# Patient Record
(demographics unavailable — no encounter records)

---

## 2025-02-13 NOTE — ASSESSMENT
[FreeTextEntry1] : 77 year old woman with non-obstructive CAD, HFmrEF, DM, HLD, chronic PE/DVT on warfarin and hypothyroidism who presents for follow up today.  1. HFmrEF: ACC/AHA C, NYHA II; LVEF improved to 50-55%. She has chronic leg swelling but is otherwise euvolemic on exam. - For GDMT: Increase Bisoprolol to 10mg daily as below; I am not using other beta blockers due to her lung disease - Continue losartan 25mg daily  2. CAD: non-obstructive as per cardiac catheterization 1/2023 with 70% mid LAD, FFR negative. She no longer has chest pain.  - PCP decreased Atorvastatin to 40mg daily as her LDL was "too low". She needs to be on a high intensity statin. I am okay with her being on 40mg daily but advised them not to decrease it further.   3. Chronic PE/DVT: She is on warfarin.   4. PVCs: She has PVCs on EKG and is having a lot of palpitations today. Increase Bisoprolol to 10mg daily.  5. Preoperative risk stratification: for cataract surgery. Patient says she has palpitations, daughter says she has chest pain and dizziness; she has shortness of breath. Will need to do a stress test prior to risk stratification.   The prevention of heart disease was discussed in detail with the patient, including adhering to a heart healthy, plant based, or Mediterranean diet, and the importance of 30 minutes of moderate intensity activity for 30 minutes, 5 times a week. All the patient's questions were answered.  RTC in 2 months.

## 2025-02-13 NOTE — PHYSICAL EXAM
[Well Developed] : well developed [Well Nourished] : well nourished [No Acute Distress] : no acute distress [Normal Conjunctiva] : normal conjunctiva [Normal Venous Pressure] : normal venous pressure [No Carotid Bruit] : no carotid bruit [Normal S1, S2] : normal S1, S2 [No Murmur] : no murmur [No Rub] : no rub [No Gallop] : no gallop [Good Air Entry] : good air entry [No Respiratory Distress] : no respiratory distress  [Sheron ____] : sheron [unfilled] [Soft] : abdomen soft [Non Tender] : non-tender [No Masses/organomegaly] : no masses/organomegaly [Normal Bowel Sounds] : normal bowel sounds [Normal Gait] : normal gait [No Cyanosis] : no cyanosis [No Clubbing] : no clubbing [No Rash] : no rash [No Skin Lesions] : no skin lesions [Moves all extremities] : moves all extremities [No Focal Deficits] : no focal deficits [Normal Speech] : normal speech [Alert and Oriented] : alert and oriented [Normal memory] : normal memory [de-identified] : 2+ pitting edema, venous stasis changes

## 2025-02-13 NOTE — CARDIOLOGY SUMMARY
[de-identified] : 2/13/25: normal sinus rhythm, LVH, PVCs 5/2/24: normal sinus rhythm, PVCs 1/17/24: normal sinus rhythm, PVCs 9/13/23: normal sinus rhythm 5/24/23: normal sinus rhythm [de-identified] : 12/21/22: abnormal  [de-identified] : 7/24/24: 1. Left ventricular cavity is normal in size. Left ventricular systolic function is normal with an ejection fraction visually estimated at 50 to 55 %. There is increased LV mass and eccentric hypertrophy. 2. Normal right ventricular cavity size and normal right ventricular systolic function. 3. No significant valvular disease. 4. Compared to the transthoracic echocardiogram performed on 7/24/2023, there have been no significant interval changes.  7/24/23: 1. Normal left ventricular cavity size. The left ventricular systolic function is mildly decreased with an ejection fraction of 52 % by Bearden's method of disks. There is increased LV mass and concentric hypertrophy. There is mild (grade 1) left ventricular diastolic dysfunction. 2. Normal right ventricular cavity size and normal right ventricular systolic function. 3. No significant valvular disease. 4. Compared to the transthoracic echocardiogram performed on 12/27/2022 The LVEF has improved on current study.  12/27/22: Normal left size, LVEF 45%. RWMA, Grade I DD. No significant valve disease.  [de-identified] : 1/9/23: 70% mid LAD, FFR negative

## 2025-02-13 NOTE — HISTORY OF PRESENT ILLNESS
[FreeTextEntry1] : MAGDIEL ROGERS is a 77 year old woman with non-obstructive CAD, HFmrEF, DM, HLD, chronic PE/DVT on warfarin and hypothyroidism who presents for follow up today.   She is not feeling great today. She no longer has chest pain. She continues to have palpitations on exertion. She continues to have shortness of breath. She denies shortness of breath at rest but has it with exertion like climbing the few stairs in her home. She denies syncope. She has chronic leg swelling form her DVTs. She denies orthopnea and PND. She uses a cane to walk. She has been feeling very dizzy when he moves her head from a lower to a higher position.   For her heart failure with recovered EF, she is on bisoprolol and losartan. She is tolerating these well.   She needs preoperative risk stratification for cataract surgery.

## 2025-02-13 NOTE — REVIEW OF SYSTEMS
[SOB] : shortness of breath [Dyspnea on exertion] : dyspnea during exertion [Chest Discomfort] : chest discomfort [Lower Ext Edema] : lower extremity edema [Blood in Stool] : blood in stool [Joint Pain] : joint pain [Negative] : Heme/Lymph

## 2025-04-10 NOTE — CARDIOLOGY SUMMARY
[de-identified] : 2/13/25: normal sinus rhythm, LVH, PVCs 5/2/24: normal sinus rhythm, PVCs 1/17/24: normal sinus rhythm, PVCs 9/13/23: normal sinus rhythm 5/24/23: normal sinus rhythm [de-identified] : 3/17/25: 1.  There is a large sized perfusion defect involving the basal to mid anteroseptal, apical septal, apical anterior, apical lateral and apical walls of the left ventricle. On the resting images, this perfusion defect is partially reversible. These findings are suggestive of ischemia with an element of attenuation artifact. 2.  LVEF is 52%, which is borderline normal. 3.  This is an intermediate risk positive study. 4.  Compared to nuclear medicine stress test from 12/21/2022, the perfusion defect is more severe. 12/21/22: abnormal  [de-identified] : 7/24/24: 1. Left ventricular cavity is normal in size. Left ventricular systolic function is normal with an ejection fraction visually estimated at 50 to 55 %. There is increased LV mass and eccentric hypertrophy. 2. Normal right ventricular cavity size and normal right ventricular systolic function. 3. No significant valvular disease. 4. Compared to the transthoracic echocardiogram performed on 7/24/2023, there have been no significant interval changes.  7/24/23: 1. Normal left ventricular cavity size. The left ventricular systolic function is mildly decreased with an ejection fraction of 52 % by Bearden's method of disks. There is increased LV mass and concentric hypertrophy. There is mild (grade 1) left ventricular diastolic dysfunction. 2. Normal right ventricular cavity size and normal right ventricular systolic function. 3. No significant valvular disease. 4. Compared to the transthoracic echocardiogram performed on 12/27/2022 The LVEF has improved on current study.  12/27/22: Normal left size, LVEF 45%. RWMA, Grade I DD. No significant valve disease.  [de-identified] : 3/24/25: 60% mid LAD, iFR negative 1/9/23: 70% mid LAD, FFR negative

## 2025-04-10 NOTE — PHYSICAL EXAM
[Well Developed] : well developed [Well Nourished] : well nourished [No Acute Distress] : no acute distress [Normal Conjunctiva] : normal conjunctiva [Normal Venous Pressure] : normal venous pressure [No Carotid Bruit] : no carotid bruit [Normal S1, S2] : normal S1, S2 [No Murmur] : no murmur [No Rub] : no rub [No Gallop] : no gallop [Good Air Entry] : good air entry [No Respiratory Distress] : no respiratory distress  [Sheron ____] : sheron [unfilled] [Soft] : abdomen soft [Non Tender] : non-tender [No Masses/organomegaly] : no masses/organomegaly [Normal Bowel Sounds] : normal bowel sounds [Normal Gait] : normal gait [No Cyanosis] : no cyanosis [No Clubbing] : no clubbing [No Rash] : no rash [No Skin Lesions] : no skin lesions [Moves all extremities] : moves all extremities [No Focal Deficits] : no focal deficits [Normal Speech] : normal speech [Alert and Oriented] : alert and oriented [Normal memory] : normal memory [de-identified] : 2+ pitting edema, venous stasis changes

## 2025-04-10 NOTE — HISTORY OF PRESENT ILLNESS
[FreeTextEntry1] : MAGDIEL ROGERS is a 78 year old woman with non-obstructive CAD, HFmrEF, DM, HLD, chronic PE/DVT on warfarin and hypothyroidism who presents for follow up today.   She is not feeling great today. She no longer has chest pain. She continues to have palpitations on exertion. She continues to have shortness of breath. She denies shortness of breath at rest but has it with exertion like climbing the few stairs in her home. She denies syncope. She has chronic leg swelling form her DVTs. She denies orthopnea and PND. She uses a cane to walk. She has been feeling very dizzy when he moves her head from a lower to a higher position.   For her heart failure with recovered EF, she is on bisoprolol and losartan. She is tolerating these well.   She needs preoperative risk stratification for cataract surgery.   She had a cath on 3/24/25 after an abnormal stress test. It showed 60% mid LAD lesion, similar from previous.

## 2025-04-10 NOTE — ASSESSMENT
[FreeTextEntry1] : 78 year old woman with non-obstructive CAD, HFmrEF, DM, HLD, chronic PE/DVT on warfarin and hypothyroidism who presents for follow up today.  1. HFmrEF: ACC/AHA C, NYHA II; LVEF improved to 50-55%. She has chronic leg swelling but is otherwise euvolemic on exam. - For GDMT: Increased Bisoprolol to 10mg daily as below; I am not using other beta blockers due to her lung disease - Continue losartan 25mg daily  2. CAD: 60% mid LAD, iFR negative on LHC done 3/2025. She denies anginal symptoms today.  - PCP decreased Atorvastatin to 40mg daily as her LDL was "too low". She needs to be on a high intensity statin. I am okay with her being on 40mg daily but advised them not to decrease it further.   3. Chronic PE/DVT: She is on warfarin.   4. PVCs: She has PVCs on EKG and is having a lot of palpitations today. Increase Bisoprolol to 10mg daily.  5. Preoperative risk stratification: for cataract surgery. She is moderate to high risk for surgery.   The prevention of heart disease was discussed in detail with the patient, including adhering to a heart healthy, plant based, or Mediterranean diet, and the importance of 30 minutes of moderate intensity activity for 30 minutes, 5 times a week. All the patient's questions were answered.  RTC in 6 months.